# Patient Record
Sex: MALE | Race: WHITE | NOT HISPANIC OR LATINO
[De-identification: names, ages, dates, MRNs, and addresses within clinical notes are randomized per-mention and may not be internally consistent; named-entity substitution may affect disease eponyms.]

---

## 2018-05-02 ENCOUNTER — RESULT REVIEW (OUTPATIENT)
Age: 63
End: 2018-05-02

## 2018-05-18 ENCOUNTER — APPOINTMENT (OUTPATIENT)
Dept: OPHTHALMOLOGY | Facility: CLINIC | Age: 63
End: 2018-05-18
Payer: COMMERCIAL

## 2018-05-18 DIAGNOSIS — H53.30 UNSPECIFIED DISORDER OF BINOCULAR VISION: ICD-10-CM

## 2018-05-18 PROCEDURE — 99204 OFFICE O/P NEW MOD 45 MIN: CPT

## 2018-05-18 PROCEDURE — 92060 SENSORIMOTOR EXAMINATION: CPT

## 2018-05-18 RX ORDER — BUTALBITAL, ACETAMINOPHEN AND CAFFEINE 325; 50; 40 MG/1; MG/1; MG/1
50-325-40 TABLET ORAL
Qty: 30 | Refills: 0 | Status: ACTIVE | COMMUNITY
Start: 2018-03-06

## 2018-05-18 RX ORDER — PANTOPRAZOLE 40 MG/1
40 TABLET, DELAYED RELEASE ORAL
Qty: 30 | Refills: 0 | Status: ACTIVE | COMMUNITY
Start: 2018-01-10

## 2018-05-18 RX ORDER — TADALAFIL 5 MG/1
5 TABLET, FILM COATED ORAL
Qty: 30 | Refills: 0 | Status: ACTIVE | COMMUNITY
Start: 2018-01-10

## 2018-05-18 RX ORDER — DOXYCYCLINE HYCLATE 20 MG/1
20 TABLET ORAL
Qty: 180 | Refills: 0 | Status: ACTIVE | COMMUNITY
Start: 2018-02-06

## 2022-09-29 ENCOUNTER — APPOINTMENT (OUTPATIENT)
Dept: ORTHOPEDIC SURGERY | Facility: CLINIC | Age: 67
End: 2022-09-29

## 2022-11-10 ENCOUNTER — APPOINTMENT (OUTPATIENT)
Dept: ORTHOPEDIC SURGERY | Facility: CLINIC | Age: 67
End: 2022-11-10

## 2023-01-12 ENCOUNTER — APPOINTMENT (OUTPATIENT)
Dept: ORTHOPEDIC SURGERY | Facility: CLINIC | Age: 68
End: 2023-01-12
Payer: MEDICARE

## 2023-01-12 VITALS — WEIGHT: 174 LBS | BODY MASS INDEX: 25.77 KG/M2 | HEIGHT: 69 IN

## 2023-01-12 PROCEDURE — 72050 X-RAY EXAM NECK SPINE 4/5VWS: CPT

## 2023-01-12 PROCEDURE — 99214 OFFICE O/P EST MOD 30 MIN: CPT

## 2023-01-12 RX ORDER — APIXABAN 5 MG/1
TABLET, FILM COATED ORAL
Refills: 0 | Status: ACTIVE | COMMUNITY

## 2023-01-12 RX ORDER — ROSUVASTATIN CALCIUM 20 MG/1
20 TABLET, FILM COATED ORAL
Qty: 90 | Refills: 0 | Status: COMPLETED | COMMUNITY
Start: 2018-01-26 | End: 2023-01-12

## 2023-01-12 RX ORDER — HYDROCORTISONE 25 MG/G
2.5 OINTMENT TOPICAL
Qty: 28 | Refills: 0 | Status: COMPLETED | COMMUNITY
Start: 2018-05-03 | End: 2023-01-12

## 2023-01-12 RX ORDER — EVOLOCUMAB 140 MG/ML
140 INJECTION, SOLUTION SUBCUTANEOUS
Qty: 2 | Refills: 0 | Status: COMPLETED | COMMUNITY
Start: 2018-01-30 | End: 2023-01-12

## 2023-01-12 NOTE — PHYSICAL EXAM
[Normal DTR UE/LE] : normal DTR UE/LE  [Able to Communicate] : able to communicate [Well Developed] : well developed [Well Nourished] : well nourished

## 2023-01-12 NOTE — IMAGING
[de-identified] : Cervical spine\par Inspection normal\par Mild to moderate tenderness trapezius on the left with mild spasm\par Active lateral rotation to 40° on the left, 50° on the right\par Foraminal closing test with rotation to the left produces pain in the posterior interscapular region\par Motor exam upper extremities normal\par \par Shoulders\par Full active range of motion without pain\par Radial pulse 2+ bilaterally [FreeTextEntry1] : Reviewed and interpreted.  Cervical spine AP, lateral, flexion, extension views-mild degenerative changes

## 2023-01-12 NOTE — HISTORY OF PRESENT ILLNESS
[Neck] : neck [Gradual] : gradual [6] : 6 [Radiating] : radiating [Intermittent] : intermittent [Household chores] : household chores [Leisure] : leisure [Rest] : rest [Sitting] : sitting [Full time] : Work status: full time [de-identified] : The patient has had recurrence of neck pain radiating down his left arm over the past several weeks.  No injury.  He had been in [place] working on his house.  He said the pain was bent when he was there but is better now.  At the present time he has mild pain left posterior neck region.  Stiffness in his neck.  Pain in his interscapular region when turning his head to the left.  Pain radiating down his left arm and numbness and tingling.  No weakness.  No chest pain or shortness of breath.  Taking Tylenol p.r.n. [] : Post Surgical Visit: no [FreeTextEntry7] : L Arm  [de-identified] : 9/23/2021 [de-identified] : Dr. 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

## 2023-01-12 NOTE — DISCUSSION/SUMMARY
[de-identified] : The patient will have new MRI cervical spine\par He is referred for physical therapy program 2-3 times a week\par Tylenol p.r.n.  Unable to take NSAIDs because he is on Eliquis\par Moist heat p.r.n.\par He'll avoid irritating activities\par \par Impression:\par Cervical strain/spondylosis/radiculopathy

## 2023-01-12 NOTE — DATA REVIEWED
[MRI] : MRI [Cervical Spine] : cervical spine [I independently reviewed and interpreted images and report] : I independently reviewed and interpreted images and report [FreeTextEntry1] : MRI cervical spine done October 1, 2021 was reviewed.  There is mild multilevel degenerative disc disease.  Mild broad disc/osteophyte at C3-4.  Small right paracentral disc protrusion at C4-5

## 2023-01-29 ENCOUNTER — APPOINTMENT (OUTPATIENT)
Dept: MRI IMAGING | Facility: CLINIC | Age: 68
End: 2023-01-29

## 2023-02-02 ENCOUNTER — APPOINTMENT (OUTPATIENT)
Dept: ORTHOPEDIC SURGERY | Facility: CLINIC | Age: 68
End: 2023-02-02

## 2023-02-18 ENCOUNTER — FORM ENCOUNTER (OUTPATIENT)
Age: 68
End: 2023-02-18

## 2023-02-19 ENCOUNTER — APPOINTMENT (OUTPATIENT)
Dept: MRI IMAGING | Facility: CLINIC | Age: 68
End: 2023-02-19
Payer: MEDICARE

## 2023-02-19 PROCEDURE — 72141 MRI NECK SPINE W/O DYE: CPT | Mod: MH

## 2023-02-26 ENCOUNTER — NON-APPOINTMENT (OUTPATIENT)
Age: 68
End: 2023-02-26

## 2023-03-16 ENCOUNTER — APPOINTMENT (OUTPATIENT)
Dept: ORTHOPEDIC SURGERY | Facility: CLINIC | Age: 68
End: 2023-03-16
Payer: MEDICARE

## 2023-03-16 VITALS — HEIGHT: 69 IN | BODY MASS INDEX: 25.77 KG/M2 | WEIGHT: 174 LBS

## 2023-03-16 DIAGNOSIS — S16.1XXD STRAIN OF MUSCLE, FASCIA AND TENDON AT NECK LEVEL, SUBSEQUENT ENCOUNTER: ICD-10-CM

## 2023-03-16 DIAGNOSIS — R93.7 ABNORMAL FINDINGS ON DIAGNOSTIC IMAGING OF OTHER PARTS OF MUSCULOSKELETAL SYSTEM: ICD-10-CM

## 2023-03-16 DIAGNOSIS — M47.812 SPONDYLOSIS W/OUT MYELOPATHY OR RADICULOPATHY, CERVICAL REGION: ICD-10-CM

## 2023-03-16 DIAGNOSIS — M54.12 RADICULOPATHY, CERVICAL REGION: ICD-10-CM

## 2023-03-16 PROCEDURE — 99214 OFFICE O/P EST MOD 30 MIN: CPT

## 2023-03-16 RX ORDER — TAMSULOSIN HYDROCHLORIDE 0.4 MG/1
0.4 CAPSULE ORAL
Qty: 30 | Refills: 0 | Status: COMPLETED | COMMUNITY
Start: 2018-02-23 | End: 2023-03-16

## 2023-03-16 NOTE — IMAGING
[de-identified] : Cervical spine\par Inspection normal\par Mild to moderate tenderness trapezius on the left with mild spasm\par Active lateral rotation to 40° on the left, 50° on the right\par Foraminal closing test with rotation to the left produces pain in the posterior interscapular region\par Motor exam upper extremities normal\par \par Shoulders\par Full active range of motion without pain\par Radial pulse 2+ bilaterally

## 2023-03-16 NOTE — DATA REVIEWED
[MRI] : MRI [Cervical Spine] : cervical spine [I independently reviewed and interpreted images and report] : I independently reviewed and interpreted images and report [FreeTextEntry1] : MRI cervical spine done February 19, 2023 was reviewed. There is multilevel degenerative disc disease. Mild broad disc protrusion at C3-4. Mild broad right paracentral disc protrusion at C4-5. Mild broad disc/osteophyte at C5-6. Mild broad left paracentral disc/osteophyte at C6-7. Reported as stable compared to prior MRI October 1, 2021

## 2023-03-16 NOTE — DISCUSSION/SUMMARY
[de-identified] : MRI results cervical spine with discussed with the patient\par He is referred for physical therapy program to [facility] 2-3 times a week\par Tylenol p.r.n.  Unable to take NSAIDs because he is on Eliquis\par Moist heat p.r.n.\par He'll avoid irritating activities\par \par Impression:\par Cervical strain/spondylosis/radiculopathy

## 2023-05-18 ENCOUNTER — APPOINTMENT (OUTPATIENT)
Dept: ORTHOPEDIC SURGERY | Facility: CLINIC | Age: 68
End: 2023-05-18

## 2024-10-24 ENCOUNTER — APPOINTMENT (OUTPATIENT)
Dept: ORTHOPEDIC SURGERY | Facility: CLINIC | Age: 69
End: 2024-10-24
Payer: MEDICARE

## 2024-10-24 VITALS — WEIGHT: 165 LBS | BODY MASS INDEX: 24.44 KG/M2 | HEIGHT: 69 IN

## 2024-10-24 DIAGNOSIS — M47.812 SPONDYLOSIS W/OUT MYELOPATHY OR RADICULOPATHY, CERVICAL REGION: ICD-10-CM

## 2024-10-24 DIAGNOSIS — S16.1XXD STRAIN OF MUSCLE, FASCIA AND TENDON AT NECK LEVEL, SUBSEQUENT ENCOUNTER: ICD-10-CM

## 2024-10-24 PROCEDURE — 72050 X-RAY EXAM NECK SPINE 4/5VWS: CPT

## 2024-10-24 PROCEDURE — 99213 OFFICE O/P EST LOW 20 MIN: CPT

## 2024-11-05 ENCOUNTER — NON-APPOINTMENT (OUTPATIENT)
Age: 69
End: 2024-11-05

## 2024-11-14 ENCOUNTER — APPOINTMENT (OUTPATIENT)
Dept: ORTHOPEDIC SURGERY | Facility: CLINIC | Age: 69
End: 2024-11-14

## 2024-12-12 ENCOUNTER — APPOINTMENT (OUTPATIENT)
Dept: ORTHOPEDIC SURGERY | Facility: CLINIC | Age: 69
End: 2024-12-12
Payer: MEDICARE

## 2024-12-12 VITALS — WEIGHT: 165 LBS | BODY MASS INDEX: 24.44 KG/M2 | HEIGHT: 69 IN

## 2024-12-12 DIAGNOSIS — M47.812 SPONDYLOSIS W/OUT MYELOPATHY OR RADICULOPATHY, CERVICAL REGION: ICD-10-CM

## 2024-12-12 DIAGNOSIS — S16.1XXD STRAIN OF MUSCLE, FASCIA AND TENDON AT NECK LEVEL, SUBSEQUENT ENCOUNTER: ICD-10-CM

## 2024-12-12 PROCEDURE — 99214 OFFICE O/P EST MOD 30 MIN: CPT

## 2025-02-27 ENCOUNTER — APPOINTMENT (OUTPATIENT)
Dept: ORTHOPEDIC SURGERY | Facility: CLINIC | Age: 70
End: 2025-02-27
Payer: MEDICARE

## 2025-02-27 ENCOUNTER — APPOINTMENT (OUTPATIENT)
Dept: ORTHOPEDIC SURGERY | Facility: CLINIC | Age: 70
End: 2025-02-27

## 2025-02-27 VITALS — HEIGHT: 69 IN | BODY MASS INDEX: 24.44 KG/M2 | WEIGHT: 165 LBS

## 2025-02-27 DIAGNOSIS — M79.644 PAIN IN RIGHT FINGER(S): ICD-10-CM

## 2025-02-27 DIAGNOSIS — M65.311 TRIGGER THUMB, RIGHT THUMB: ICD-10-CM

## 2025-02-27 PROCEDURE — 73140 X-RAY EXAM OF FINGER(S): CPT | Mod: RT

## 2025-02-27 PROCEDURE — 99203 OFFICE O/P NEW LOW 30 MIN: CPT

## 2025-02-27 RX ORDER — DICLOFENAC SODIUM 10 MG/G
1 GEL TOPICAL DAILY
Qty: 1 | Refills: 3 | Status: ACTIVE | COMMUNITY
Start: 2025-02-27 | End: 1900-01-01

## 2025-03-25 ENCOUNTER — NON-APPOINTMENT (OUTPATIENT)
Age: 70
End: 2025-03-25